# Patient Record
Sex: FEMALE | Race: BLACK OR AFRICAN AMERICAN
[De-identification: names, ages, dates, MRNs, and addresses within clinical notes are randomized per-mention and may not be internally consistent; named-entity substitution may affect disease eponyms.]

---

## 2019-10-17 ENCOUNTER — HOSPITAL ENCOUNTER (EMERGENCY)
Dept: HOSPITAL 62 - ER | Age: 39
LOS: 1 days | Discharge: HOME | End: 2019-10-18
Payer: COMMERCIAL

## 2019-10-17 DIAGNOSIS — R07.9: Primary | ICD-10-CM

## 2019-10-17 DIAGNOSIS — K21.9: ICD-10-CM

## 2019-10-17 LAB
ADD MANUAL DIFF: NO
ALBUMIN SERPL-MCNC: 4.2 G/DL (ref 3.5–5)
ALP SERPL-CCNC: 74 U/L (ref 38–126)
ANION GAP SERPL CALC-SCNC: 7 MMOL/L (ref 5–19)
APTT BLD: 31.7 SEC (ref 23.5–35.8)
AST SERPL-CCNC: 24 U/L (ref 14–36)
BASOPHILS # BLD AUTO: 0 10^3/UL (ref 0–0.2)
BASOPHILS NFR BLD AUTO: 0.9 % (ref 0–2)
BILIRUB DIRECT SERPL-MCNC: 0 MG/DL (ref 0–0.4)
BILIRUB SERPL-MCNC: 0.3 MG/DL (ref 0.2–1.3)
BUN SERPL-MCNC: 8 MG/DL (ref 7–20)
CALCIUM: 9.6 MG/DL (ref 8.4–10.2)
CHLORIDE SERPL-SCNC: 103 MMOL/L (ref 98–107)
CK MB SERPL-MCNC: 0.22 NG/ML (ref ?–4.55)
CK MB SERPL-MCNC: 0.24 NG/ML (ref ?–4.55)
CK SERPL-CCNC: 89 U/L (ref 30–135)
CO2 SERPL-SCNC: 27 MMOL/L (ref 22–30)
EOSINOPHIL # BLD AUTO: 0.2 10^3/UL (ref 0–0.6)
EOSINOPHIL NFR BLD AUTO: 2.8 % (ref 0–6)
ERYTHROCYTE [DISTWIDTH] IN BLOOD BY AUTOMATED COUNT: 16.9 % (ref 11.5–14)
GLUCOSE SERPL-MCNC: 99 MG/DL (ref 75–110)
HCT VFR BLD CALC: 36.2 % (ref 36–47)
HGB BLD-MCNC: 11.7 G/DL (ref 12–15.5)
INR PPP: 1.01
LYMPHOCYTES # BLD AUTO: 2.1 10^3/UL (ref 0.5–4.7)
LYMPHOCYTES NFR BLD AUTO: 39.4 % (ref 13–45)
MCH RBC QN AUTO: 24.4 PG (ref 27–33.4)
MCHC RBC AUTO-ENTMCNC: 32.4 G/DL (ref 32–36)
MCV RBC AUTO: 76 FL (ref 80–97)
MONOCYTES # BLD AUTO: 0.6 10^3/UL (ref 0.1–1.4)
MONOCYTES NFR BLD AUTO: 10.5 % (ref 3–13)
NEUTROPHILS # BLD AUTO: 2.5 10^3/UL (ref 1.7–8.2)
NEUTS SEG NFR BLD AUTO: 46.4 % (ref 42–78)
NT PRO BNP: 35 PG/ML (ref ?–125)
PLATELET # BLD: 319 10^3/UL (ref 150–450)
POTASSIUM SERPL-SCNC: 4 MMOL/L (ref 3.6–5)
PROT SERPL-MCNC: 8 G/DL (ref 6.3–8.2)
PROTHROMBIN TIME: 13.3 SEC (ref 11.4–15.4)
RBC # BLD AUTO: 4.79 10^6/UL (ref 3.72–5.28)
TOTAL CELLS COUNTED % (AUTO): 100 %
TROPONIN I SERPL-MCNC: < 0.012 NG/ML
TROPONIN I SERPL-MCNC: < 0.012 NG/ML
WBC # BLD AUTO: 5.4 10^3/UL (ref 4–10.5)

## 2019-10-17 PROCEDURE — S0028 INJECTION, FAMOTIDINE, 20 MG: HCPCS

## 2019-10-17 PROCEDURE — 84443 ASSAY THYROID STIM HORMONE: CPT

## 2019-10-17 PROCEDURE — 83735 ASSAY OF MAGNESIUM: CPT

## 2019-10-17 PROCEDURE — 71046 X-RAY EXAM CHEST 2 VIEWS: CPT

## 2019-10-17 PROCEDURE — 96374 THER/PROPH/DIAG INJ IV PUSH: CPT

## 2019-10-17 PROCEDURE — 83880 ASSAY OF NATRIURETIC PEPTIDE: CPT

## 2019-10-17 PROCEDURE — 83690 ASSAY OF LIPASE: CPT

## 2019-10-17 PROCEDURE — 80053 COMPREHEN METABOLIC PANEL: CPT

## 2019-10-17 PROCEDURE — 85610 PROTHROMBIN TIME: CPT

## 2019-10-17 PROCEDURE — 93010 ELECTROCARDIOGRAM REPORT: CPT

## 2019-10-17 PROCEDURE — 93005 ELECTROCARDIOGRAM TRACING: CPT

## 2019-10-17 PROCEDURE — 36415 COLL VENOUS BLD VENIPUNCTURE: CPT

## 2019-10-17 PROCEDURE — 84484 ASSAY OF TROPONIN QUANT: CPT

## 2019-10-17 PROCEDURE — 99285 EMERGENCY DEPT VISIT HI MDM: CPT

## 2019-10-17 PROCEDURE — 85730 THROMBOPLASTIN TIME PARTIAL: CPT

## 2019-10-17 PROCEDURE — 96375 TX/PRO/DX INJ NEW DRUG ADDON: CPT

## 2019-10-17 PROCEDURE — 85025 COMPLETE CBC W/AUTO DIFF WBC: CPT

## 2019-10-17 PROCEDURE — 82553 CREATINE MB FRACTION: CPT

## 2019-10-17 PROCEDURE — 85379 FIBRIN DEGRADATION QUANT: CPT

## 2019-10-17 PROCEDURE — 82550 ASSAY OF CK (CPK): CPT

## 2019-10-17 NOTE — ER DOCUMENT REPORT
ED Cardiac





- General


Chief Complaint: Chest Pressure


Stated Complaint: CHEST PAIN


Time Seen by Provider: 10/17/19 17:26


Primary Care Provider: 


ROBERTO FOSTER DO [Primary Care Provider] - Follow up as needed


VEDA BEAR MD [ACTIVE STAFF] - Follow up as needed


CHARLY MCNEIL MD [ACTIVE STAFF] - Follow up as needed


Mode of Arrival: Ambulatory


Information source: Patient


Notes: 





Patient presents with a 2-day history of chest pain that she describes as a 

pressure and a heaviness.  Patient denies any cough cold symptoms nausea or 

vomiting.  Patient denies any significant medical history.  Patient does report 

history of some indigestion.  Patient was given a GI cocktail in triage and does

report that it helped her pain symptoms.


TRAVEL OUTSIDE OF THE [place] IN LAST 30 DAYS: No





- HPI


Patient complains to provider of: Chest pain


Chest pain location: Under breast


Quality of pain: Heaviness, Pressure


Chest pain radiation location: Left shoulder


Pain level currently: 2


Cardiac risk factors: denies: Hypertension, Smoker, Hx MI


Associated symptoms: Heartburn, Shortness of breath.  denies: Abdominal pain, 

Back pain, Lightheaded


Exacerbated by: Denies


Relieved by: Nothing


Similar symptoms previously: No


Recently seen / treated by doctor: No





- Related Data


Allergies/Adverse Reactions: 


                                        





No Known Allergies Allergy (Verified 10/17/19 17:27)


   











Past Medical History





- General


Information source: Patient





- Social History


Smoking Status: Never Smoker


Chew tobacco use (# tins/day): No


Frequency of alcohol use: None


Drug Abuse: None


Occupation: 


Family History: CVA, DM, Malignancy


Patient has suicidal ideation: No


Patient has homicidal ideation: No





- Medical History


Medical History: Negative





- Past Medical History


Cardiac Medical History: 


   Denies: Hx Heart Attack, Hx Hypertension


Pulmonary Medical History: 


   Denies: Hx Asthma


Renal/ Medical History: Denies: Hx Peritoneal Dialysis


Infectious Medical History: Denies: Hx Hepatitis


Past Surgical History: Reports: Hx Gynecologic Surgery - Laproscopy to remove 

growth from cervix, Other - Cyst removed from scalp





- Immunizations


Hx Diphtheria, Pertussis, Tetanus Vaccination: No





Review of Systems





- Review of Systems


Constitutional: No symptoms reported.  denies: Fever, Recent illness


EENT: No symptoms reported


Cardiovascular: Chest pain


Respiratory: Short of breath.  denies: Cough, Hemoptysis


Gastrointestinal: No symptoms reported.  denies: Abdominal pain, Nausea, 

Vomiting


Genitourinary: No symptoms reported


Female Genitourinary: No symptoms reported


Musculoskeletal: No symptoms reported.  denies: Back pain


Skin: No symptoms reported


Hematologic/Lymphatic: No symptoms reported


Neurological/Psychological: No symptoms reported





Physical Exam





- Vital signs


Vitals: 


                                        











Temp Pulse Resp BP Pulse Ox


 


 98.0 F   76   12   113/64   100 


 


 10/17/19 16:51  10/17/19 16:51  10/17/19 16:51  10/17/19 16:51  10/17/19 16:51














- General


General appearance: Appears well, Alert


In distress: None





- HEENT


Head: Normocephalic, Atraumatic


Eyes: Normal


Conjunctiva: Normal


Nasal: Normal


Neck: Normal, Supple.  No: Lymphadenopathy





- Respiratory


Respiratory status: No respiratory distress


Chest status: Nontender


Breath sounds: Normal.  No: Rales, Rhonchi, Stridor, Wheezing


Chest palpation: Normal





- Cardiovascular


Rhythm: Regular


Heart sounds: S1 appreciated, S2 appreciated


Murmur: No





- Abdominal


Inspection: Normal


Distension: No distension


Bowel sounds: Hyperactive


Tenderness: Nontender


Organomegaly: No organomegaly





- Back


Back: Normal, Nontender.  No: CVA tenderness





- Extremities


General upper extremity: Normal inspection, Normal ROM


General lower extremity: Normal inspection, Normal ROM





- Neurological


Neuro grossly intact: Yes


Cognition: Normal


[place] Coma Scale Eye Opening: Spontaneous


Liv Coma Scale Verbal: Oriented


Liv Coma Scale Motor: Obeys Commands


[place] Coma Scale Total: 15





- Psychological


Associated symptoms: Normal affect, Normal mood





- Skin


Skin Temperature: Warm


Skin Moisture: Dry


Skin Color: Normal





Course





- Re-evaluation


Re-evalutation: 





10/18/19 01:09


The patient has atypical chest pain as the patient's chest pain is not 

suggestive of pulmonary embolus, cardiac ischemia, aortic dissection, or other 

serious etiology.  Given the extremely low risk of these diagnoses, evaluation 

for these possibilities does not appear to be indicated at this time.  Patient 

has been instructed to return if the symptoms worsen or change in any way.





- Vital Signs


Vital signs: 


                                        











Temp Pulse Resp BP Pulse Ox


 


 98.5 F   76   15   104/72   100 


 


 10/18/19 01:33  10/17/19 16:51  10/18/19 01:01  10/18/19 01:01  10/18/19 01:01














- Laboratory


Result Diagrams: 


                                 10/17/19 18:19





                                 10/17/19 18:19


Laboratory results interpreted by me: 


                                        











  10/17/19





  18:19


 


Hgb  11.7 L


 


MCV  76 L


 


MCH  24.4 L


 


RDW  16.9 H











10/18/19 01:08





                               Labs- Entire Visit











  10/17/19 10/17/19 10/17/19





  18:19 18:19 18:19


 


WBC  5.4  


 


RBC  4.79  


 


Hgb  11.7 L  


 


Hct  36.2  


 


MCV  76 L  


 


MCH  24.4 L  


 


MCHC  32.4  


 


RDW  16.9 H  


 


Plt Count  319  


 


Lymph % (Auto)  39.4  


 


Mono % (Auto)  10.5  


 


Eos % (Auto)  2.8  


 


Baso % (Auto)  0.9  


 


Absolute Neuts (auto)  2.5  


 


Absolute Lymphs (auto)  2.1  


 


Absolute Monos (auto)  0.6  


 


Absolute Eos (auto)  0.2  


 


Absolute Basos (auto)  0.0  


 


Seg Neutrophils %  46.4  


 


PT    13.3


 


INR    1.01


 


APTT    31.7


 


D-Dimer   


 


Sodium   137.4 


 


Potassium   4.0 


 


Chloride   103 


 


Carbon Dioxide   27 


 


Anion Gap   7 


 


BUN   8 


 


Creatinine   0.83 


 


Est GFR ( Amer)   > 60 


 


Est GFR (MDRD) Non-Af   > 60 


 


Glucose   99 


 


Calcium   9.6 


 


Magnesium   2.0 


 


Total Bilirubin   0.3 


 


Direct Bilirubin   0.0 


 


Neonat Total Bilirubin   Not Reportable 


 


Neonat Direct Bilirubin   Not Reportable 


 


Neonat Indirect Bili   Not Reportable 


 


AST   24 


 


ALT   25 


 


Alkaline Phosphatase   74 


 


Creatine Kinase   89 


 


CK-MB (CK-2)   


 


Troponin I   


 


NT-Pro-B Natriuret Pep   


 


Total Protein   8.0 


 


Albumin   4.2 


 


Lipase   


 


TSH   














  10/17/19 10/17/19 10/17/19





  18:19 18:19 18:19


 


WBC   


 


RBC   


 


Hgb   


 


Hct   


 


MCV   


 


MCH   


 


MCHC   


 


RDW   


 


Plt Count   


 


Lymph % (Auto)   


 


Mono % (Auto)   


 


Eos % (Auto)   


 


Baso % (Auto)   


 


Absolute Neuts (auto)   


 


Absolute Lymphs (auto)   


 


Absolute Monos (auto)   


 


Absolute Eos (auto)   


 


Absolute Basos (auto)   


 


Seg Neutrophils %   


 


PT   


 


INR   


 


APTT   


 


D-Dimer   


 


Sodium   


 


Potassium   


 


Chloride   


 


Carbon Dioxide   


 


Anion Gap   


 


BUN   


 


Creatinine   


 


Est GFR ( Amer)   


 


Est GFR (MDRD) Non-Af   


 


Glucose   


 


Calcium   


 


Magnesium   


 


Total Bilirubin   


 


Direct Bilirubin   


 


Neonat Total Bilirubin   


 


Neonat Direct Bilirubin   


 


Neonat Indirect Bili   


 


AST   


 


ALT   


 


Alkaline Phosphatase   


 


Creatine Kinase   


 


CK-MB (CK-2)  0.22  


 


Troponin I  < 0.012  


 


NT-Pro-B Natriuret Pep  35  


 


Total Protein   


 


Albumin   


 


Lipase    168.4


 


TSH   2.12 














  10/17/19 10/17/19 10/17/19





  18:19 22:59 22:59


 


WBC   


 


RBC   


 


Hgb   


 


Hct   


 


MCV   


 


MCH   


 


MCHC   


 


RDW   


 


Plt Count   


 


Lymph % (Auto)   


 


Mono % (Auto)   


 


Eos % (Auto)   


 


Baso % (Auto)   


 


Absolute Neuts (auto)   


 


Absolute Lymphs (auto)   


 


Absolute Monos (auto)   


 


Absolute Eos (auto)   


 


Absolute Basos (auto)   


 


Seg Neutrophils %   


 


PT   


 


INR   


 


APTT   


 


D-Dimer  0.30  


 


Sodium   


 


Potassium   


 


Chloride   


 


Carbon Dioxide   


 


Anion Gap   


 


BUN   


 


Creatinine   


 


Est GFR ( Amer)   


 


Est GFR (MDRD) Non-Af   


 


Glucose   


 


Calcium   


 


Magnesium   


 


Total Bilirubin   


 


Direct Bilirubin   


 


Neonat Total Bilirubin   


 


Neonat Direct Bilirubin   


 


Neonat Indirect Bili   


 


AST   


 


ALT   


 


Alkaline Phosphatase   


 


Creatine Kinase   86 


 


CK-MB (CK-2)    0.24


 


Troponin I    < 0.012


 


NT-Pro-B Natriuret Pep   


 


Total Protein   


 


Albumin   


 


Lipase   


 


TSH   














- Diagnostic Test


Radiology reviewed: Image reviewed, Reports reviewed





- EKG Interpretation by Me


EKG shows normal: Sinus rhythm


Rate: Normal


Rhythm: NSR


Additional EKG results interpreted by me: 





10/18/19 01:09


No ST elevation, no T wave inversion, 





Discharge





- Discharge


Clinical Impression: 


Chest pain


Qualifiers:


 Chest pain type: unspecified Qualified Code(s): R07.9 - Chest pain, unspecified





GERD (gastroesophageal reflux disease)


Qualifiers:


 Esophagitis presence: esophagitis presence not specified Qualified Code(s): 

K21.9 - Gastro-esophageal reflux disease without esophagitis





Condition: Stable


Disposition: HOME, SELF-CARE


Instructions:  Chest Pain of Unclear Cause ([facility]), Prilosec (Acid Pump Inhibitor)

([facility]), Reflux Disease (GERD) ([facility])


Additional Instructions: 


Return immediately for any new or worsening symptoms





Followup with your primary care provider, call tomorrow to make a followup 

appointment





You were seen today for chest pain.  The exact cause of your pain is unclear. 

However, based on your cardiac enzyme testing, chest x-ray, and EKG it does not 

appear that it is from an immediately life-threatening cause at this time.  

Although your testing here is normal is critical that you follow-up with your 

primary care physician for continued evaluation of this chest pain and possible 

stress testing.  I recommended you see your physician within the next 24-48 

hours to be evaluated for consideration of a stress test.  Please return to 

emergency department immediately if you have worsening of your chest pain, 

shortness of breath, vomiting, become unable to exert yourself due to pain or 

difficulty breathing, you pass out, or have any pain that radiates into your 

arms, jaw, or back. Please also return if you have any additional symptoms that 

are concerning to you.








Prescriptions: 


Sucralfate [Carafate 1 gm Tablet] 1 gm PO ACHS #40 tablet


Omeprazole Magnesium [Prilosec Otc] 20 mg PO DAILY #15 tablet.dr


Forms:  Return to Work


Referrals: 


ROBERTO FOSTER DO [Primary Care Provider] - Follow up as needed


CHARLY MCNEIL MD [ACTIVE STAFF] - Follow up as needed


VEDA BEAR MD [ACTIVE STAFF] - Follow up as needed

## 2019-10-17 NOTE — ER DOCUMENT REPORT
ED Medical Screen (RME)





- General


Chief Complaint: Chest Pain


Stated Complaint: CHEST PAIN


Time Seen by Provider: 10/17/19 17:26


Mode of Arrival: Ambulatory


Information source: Patient


Notes: 





39-year-old female presents to ED for chest pressure left side of her chest 

going down her left arm and through to her back.  She states she is also been 

having indigestion.  This is all been going on for 2 days.  She is alert and 

oriented.  Patient denies any heart problems blood pressure cholesterol or 

abdominal problems.  Last menstrual cycle started about 4 October.  She states 

the pain has gotten worse today that is what brought her into the emergency 

room.














I have greeted and performed a rapid initial assessment of this patient.  A 

comprehensive ED assessment and evaluation of the patient, analysis of test 

results and completion of medical decision making process will be conducted by 

an additional ED providers.


TRAVEL OUTSIDE OF THE [place] IN LAST 30 DAYS: No





- Related Data


Allergies/Adverse Reactions: 


                                        





No Known Allergies Allergy (Verified 10/14/17 09:00)


   











Past Medical History





- Past Medical History


Cardiac Medical History: 


   Denies: Hx Heart Attack, Hx Hypertension


Pulmonary Medical History: 


   Denies: Hx Asthma


Neurological Medical History: Denies: Hx Cerebrovascular Accident, Hx Seizures


Renal/ Medical History: Denies: Hx Peritoneal Dialysis


GI Medical History: Denies: Hx Hepatitis, Hx Hiatal Hernia, Hx Ulcer


Infectious Medical History: Denies: Hx Hepatitis


Past Surgical History: Reports: Hx Gynecologic Surgery - Looposcopy to remove 

growth from cervix, Other - Cyst removed from scalp.  Denies: Hx Hysterectomy, 

Hx Mastectomy, Hx Open Heart Surgery, Hx Pacemaker





- Immunizations


Hx Diphtheria, Pertussis, Tetanus Vaccination: No





Physical Exam





- Vital signs


Vitals: 





                                        











Temp Pulse Resp BP Pulse Ox


 


 98.0 F   76   12   113/64   100 


 


 10/17/19 16:51  10/17/19 16:51  10/17/19 16:51  10/17/19 16:51  10/17/19 16:51














Course





- Vital Signs


Vital signs: 





                                        











Temp Pulse Resp BP Pulse Ox


 


 98.0 F   76   12   113/64   100 


 


 10/17/19 16:51  10/17/19 16:51  10/17/19 16:51  10/17/19 16:51  10/17/19 16:51

## 2019-10-17 NOTE — RADIOLOGY REPORT (SQ)
EXAM DESCRIPTION:  CHEST 2 VIEWS



COMPLETED DATE/TIME:  10/17/2019 5:50 pm



REASON FOR STUDY:  chest pain



COMPARISON:  None.



EXAM PARAMETERS:  NUMBER OF VIEWS: two views

TECHNIQUE: Digital Frontal and Lateral radiographic views of the chest acquired.

RADIATION DOSE: NA

LIMITATIONS: none



FINDINGS:  LUNGS AND PLEURA: No opacities, masses or pneumothorax. No pleural effusion.

MEDIASTINUM AND HILAR STRUCTURES: No masses or contour abnormalities.

HEART AND VASCULAR STRUCTURES: Heart normal size.  No evidence for failure.

BONES: No acute findings.  Dextroconvex scoliosis.

HARDWARE: None in the chest.

OTHER: No other significant finding.



IMPRESSION:  NO ACUTE RADIOGRAPHIC FINDING IN THE CHEST.



TECHNICAL DOCUMENTATION:  JOB ID:  [id number]

 2011 [employer]- All Rights Reserved



Reading location - IP/workstation name: [email or web address]

## 2019-10-18 VITALS — DIASTOLIC BLOOD PRESSURE: 72 MMHG | SYSTOLIC BLOOD PRESSURE: 104 MMHG
